# Patient Record
Sex: FEMALE | URBAN - METROPOLITAN AREA
[De-identification: names, ages, dates, MRNs, and addresses within clinical notes are randomized per-mention and may not be internally consistent; named-entity substitution may affect disease eponyms.]

---

## 2021-07-08 ENCOUNTER — APPOINTMENT (RX ONLY)
Dept: URBAN - METROPOLITAN AREA CLINIC 152 | Facility: CLINIC | Age: 66
Setting detail: DERMATOLOGY
End: 2021-07-08

## 2021-07-08 DIAGNOSIS — D17 BENIGN LIPOMATOUS NEOPLASM: ICD-10-CM

## 2021-07-08 DIAGNOSIS — L85.3 XEROSIS CUTIS: ICD-10-CM

## 2021-07-08 DIAGNOSIS — L72.8 OTHER FOLLICULAR CYSTS OF THE SKIN AND SUBCUTANEOUS TISSUE: ICD-10-CM

## 2021-07-08 PROBLEM — D17.1 BENIGN LIPOMATOUS NEOPLASM OF SKIN AND SUBCUTANEOUS TISSUE OF TRUNK: Status: ACTIVE | Noted: 2021-07-08

## 2021-07-08 PROCEDURE — 99204 OFFICE O/P NEW MOD 45 MIN: CPT | Mod: 25

## 2021-07-08 PROCEDURE — 10061 I&D ABSCESS COMP/MULTIPLE: CPT

## 2021-07-08 PROCEDURE — ? COUNSELING

## 2021-07-08 PROCEDURE — ? INCISION AND DRAINAGE

## 2021-07-08 PROCEDURE — ? PRESCRIPTION MEDICATION MANAGEMENT

## 2021-07-08 PROCEDURE — ? DIAGNOSIS COMMENT

## 2021-07-08 ASSESSMENT — LOCATION SIMPLE DESCRIPTION DERM
LOCATION SIMPLE: RIGHT UPPER BACK
LOCATION SIMPLE: CHEST

## 2021-07-08 ASSESSMENT — LOCATION DETAILED DESCRIPTION DERM
LOCATION DETAILED: RIGHT SUPERIOR UPPER BACK
LOCATION DETAILED: MIDDLE STERNUM

## 2021-07-08 ASSESSMENT — LOCATION ZONE DERM: LOCATION ZONE: TRUNK

## 2021-07-08 NOTE — PROCEDURE: DIAGNOSIS COMMENT
Comment: Advised pt to see Dr. Kumari for lipoma excision.
Detail Level: Simple
Render Risk Assessment In Note?: no